# Patient Record
(demographics unavailable — no encounter records)

---

## 2025-03-30 NOTE — HISTORY OF PRESENT ILLNESS
[FreeTextEntry1] :  71 year old male patient seen for evaluation of elevated PSA of 2.95 from 2/2025, previously 2.6 in January 2024, and 2.2 in late December 2022. He complains of slow stream, although it is not bothersome. He has nocturia x1. Denies hematuria, dysuria, or UTIs. Denies urgency or urge  incontinence. IPSS = 8. He has prior history of passed stone.   PMHx:  hypertension, sleep apnea PSHx: heart ablation for PVCs, tonsillectomy ALG: Penicillin SHx: No tobacco; social alcohol, 2 cups of caffeine daily  FHx: He comes from a large family and has 1 uncle on father's side with prostate cancer.

## 2025-03-30 NOTE — ASSESSMENT
[FreeTextEntry1] :  71 year old male patient with elevated PSA, no significant symptoms. He does not want any treatment for slow stream at present. We discussed the pathophysiology of BPH and prostate cancer and the role of PSA screening. I recommend proceeding with an MRI to get a better idea of the prostate size, to rule out suspicious areas for prostate cancer, and the pt is agreeable to this. We will see him back after he has obtained the MRI to discuss the possible need for fusion biopsies. All his questions were otherwise answered. Total time=45 min   The submitted E/M billing level for this visit reflects the total time spent on the day of the visit including face-to-face time spent with the patient, non-face-to-face review of medical records and relevant information, documentation, and asynchronous communication with the patient after a visit via phone, email, or patients EHR portal after the visit. The medical records reviewed are either scanned into the chart or reviewed with the patient using a patients electronic medical records portal for patients with records not available to [facility] via electronic transmission platforms from other institutions and labs. Time spent counseling and performing coordination of care was also included in determining the appropriate EM billing level.   I have reviewed and verified information regarding the chief complaint and history recorded by the ancillary staff and/or the patient. I have independently reviewed and interpreted tests performed by other physicians and facilities as necessary.   I have discussed with the patient differential diagnosis, reason for auxiliary tests if ordered, risks, benefits, alternatives, and complications of each form of therapy were discussed.  I personally performed the services described in the documentation, reviewed the documentation recorded by the scribe in my presence, and it accurately and completely records my words and actions.

## 2025-03-30 NOTE — ASSESSMENT
[FreeTextEntry1] :  71 year old male patient with elevated PSA, no significant symptoms. He does not want any treatment for slow stream at present. We discussed the pathophysiology of BPH and prostate cancer and the role of PSA screening. I recommend proceeding with an MRI to get a better idea of the prostate size, to rule out suspicious areas for prostate cancer, and the pt is agreeable to this. We will see him back after he has obtained the MRI to discuss the possible need for fusion biopsies. All his questions were otherwise answered. Total time=45 min   The submitted E/M billing level for this visit reflects the total time spent on the day of the visit including face-to-face time spent with the patient, non-face-to-face review of medical records and relevant information, documentation, and asynchronous communication with the patient after a visit via phone, email, or patients EHR portal after the visit. The medical records reviewed are either scanned into the chart or reviewed with the patient using a patients electronic medical records portal for patients with records not available to [facility] via electronic transmission platforms from other institutions and labs. Time spent counseling and performing coordination of care was also included in determining the appropriate EM billing level.   I have reviewed and verified information regarding the chief complaint and history recorded by the ancillary staff and/or the patient. I have independently reviewed and interpreted tests performed by other physicians and facilities as necessary.   I have discussed with the patient differential diagnosis, reason for auxiliary tests if ordered, risks, benefits, alternatives, and complications of each form of therapy were discussed.  I personally performed the services described in the documentation, reviewed the documentation recorded by the scribe in my presence, and it accurately and completely records my words and actions.

## 2025-03-30 NOTE — ADDENDUM
[FreeTextEntry1] :  ERIN YEAGER, am scribing for and in the presence of  in the following sections: HISTORY OF PRESENT ILLNESS, PAST MEDICAL/FAMILY/SOCIAL HISTORY, REVIEW OF SYSTEMS, VITAL SIGNS, PHYSICAL EXAM, ASSESSMENT/PLAN on 03/24/2025.

## 2025-05-17 NOTE — HISTORY OF PRESENT ILLNESS
[FreeTextEntry1] :  71 year old male patient seen in follow-up for BPH, LUTS, elevated PSA of 2.9, who underwent MRI, showing the findings below including 112 cc prostate with no targetable lesions.  We discussed this in detail. He is not particularly bothered with his symptoms at this point. His stream is a little slow. His IPSS is 8. He does not want any medications at this point.   MRI Prostate from 3/26/25:  No MRI targetable lesions. PIRADS 2 - Low (clinically significant cancer is unlikely to be present) Prostate Volume: 112 mL PSA density: 0.03 ng/mL/mL

## 2025-05-17 NOTE — ADDENDUM
[FreeTextEntry1] :  ERIN YEAGER, am scribing for and in the presence of  in the following sections: HISTORY OF PRESENT ILLNESS, PAST MEDICAL/FAMILY/SOCIAL HISTORY, REVIEW OF SYSTEMS, VITAL SIGNS, PHYSICAL EXAM, ASSESSMENT/PLAN on 05/09/2025.        no

## 2025-05-17 NOTE — ASSESSMENT
[FreeTextEntry1] :  71 year old male patient with elevated PSA, explainable on the basis of large prostate; BPH with mild LUTS.  Patient can follow-up in 9 months with a flowrate and PVR with PSA then. We will make any further decisions from that.  All of the patient's questions were otherwise answered. He was given a copy of his MRI report. Total time=30 min.   The submitted E/M billing level for this visit reflects the total time spent on the day of the visit including face-to-face time spent with the patient, non-face-to-face review of medical records and relevant information, documentation, and asynchronous communication with the patient after a visit via phone, email, or patients EHR portal after the visit. The medical records reviewed are either scanned into the chart or reviewed with the patient using a patients electronic medical records portal for patients with records not available to [facility] via electronic transmission platforms from other institutions and labs. Time spent counseling and performing coordination of care was also included in determining the appropriate EM billing level.   I have reviewed and verified information regarding the chief complaint and history recorded by the ancillary staff and/or the patient. I have independently reviewed and interpreted tests performed by other physicians and facilities as necessary.   I have discussed with the patient differential diagnosis, reason for auxiliary tests if ordered, risks, benefits, alternatives, and complications of each form of therapy were discussed.  I personally performed the services described in the documentation, reviewed the documentation recorded by the scribe in my presence, and it accurately and completely records my words and actions.

## 2025-05-17 NOTE — ADDENDUM
[FreeTextEntry1] :  ERIN YEAGER, am scribing for and in the presence of  in the following sections: HISTORY OF PRESENT ILLNESS, PAST MEDICAL/FAMILY/SOCIAL HISTORY, REVIEW OF SYSTEMS, VITAL SIGNS, PHYSICAL EXAM, ASSESSMENT/PLAN on 05/09/2025.

## 2025-05-17 NOTE — ASSESSMENT
[FreeTextEntry1] :  71 year old male patient with elevated PSA, explainable on the basis of large prostate; BPH with mild LUTS.  Patient can follow-up in 9 months with a flowrate and PVR with PSA then. We will make any further decisions from that.  All of the patient's questions were otherwise answered. He was given a copy of his MRI report. Total time=30 min.   The submitted E/M billing level for this visit reflects the total time spent on the day of the visit including face-to-face time spent with the patient, non-face-to-face review of medical records and relevant information, documentation, and asynchronous communication with the patient after a visit via phone, email, or patients EHR portal after the visit. The medical records reviewed are either scanned into the chart or reviewed with the patient using a patients electronic medical records portal for patients with records not available to [facility] via electronic transmission platforms from other institutions and labs. Time spent counseling and performing coordination of care was also included in determining the appropriate EM billing level.   I have reviewed and verified information regarding the chief complaint and history recorded by the ancillary staff and/or the patient. I have independently reviewed and interpreted tests performed by other physicians and facilities as necessary.   I have discussed with the patient differential diagnosis, reason for auxiliary tests if ordered, risks, benefits, alternatives, and complications of each form of therapy were discussed.  I personally performed the services described in the documentation, reviewed the documentation recorded by the scribe in my presence, and it accurately and completely records my words and actions.